# Patient Record
Sex: MALE | URBAN - METROPOLITAN AREA
[De-identification: names, ages, dates, MRNs, and addresses within clinical notes are randomized per-mention and may not be internally consistent; named-entity substitution may affect disease eponyms.]

---

## 2019-07-09 ENCOUNTER — SKIN CHECK (OUTPATIENT)
Dept: URBAN - METROPOLITAN AREA CLINIC 17 | Facility: CLINIC | Age: 24
Setting detail: DERMATOLOGY
End: 2019-07-09

## 2019-07-09 DIAGNOSIS — L57.0 ACTINIC KERATOSIS: ICD-10-CM

## 2019-07-09 PROCEDURE — 99203 OFFICE O/P NEW LOW 30 MIN: CPT

## 2019-07-09 PROCEDURE — 11102 TANGNTL BX SKIN SINGLE LES: CPT

## 2022-08-04 NOTE — PROCEDURE: TREATMENT REGIMEN
Detail Level: Zone
Plan: OTC Ammonium Lactate 12% lotion twice daily as needed\\nMinimize soap/cleanser use and avoid scrubbing/picking\\nApply emollients regularly, especially after showering

## 2022-08-04 NOTE — PROCEDURE: OTC TREATMENT REGIMEN
Patient Specific Otc Recommendations (Will Not Stick From Patient To Patient): Mild Cleansers (Cetaphil or Dove bar soap, or unscented body wash such as  Aveeno, Dove, or Cetaphil Restoraderm Cleanser)\\nApply moisturizer within 3 minutes of drying off after shower - recommend Neutrogena Hydroboost Body Gel Cream, Fragrance Free (large pump bottle of lotion)\\nRecommend [employer] Working Hands 3-4 times a day for hands, especially at bedtime\\n\\n
Detail Level: Zone

## 2022-08-04 NOTE — PROCEDURE: PRESCRIPTION MEDICATION MANAGEMENT
Plan: Rx cefdinir 300mg twice a day x 10 days\\nRx Triamcinolone 0.1% cream - apply twice a day as needed to inflamed cyst (2 weeks in  a row maximum)\\nPatient to call if lesion persists - will need excision if persists/recurs
Detail Level: Zone
Render In Strict Bullet Format?: No

## 2025-03-06 NOTE — PROCEDURE: DEFER
Detail Level: Detailed
X Size Of Lesion In Cm (Optional): 0
Introduction Text (Please End With A Colon): The following procedure was deferred: biopsy by shave
Procedure To Be Performed At Next Visit: Biopsy by shave method